# Patient Record
Sex: MALE | Race: OTHER
[De-identification: names, ages, dates, MRNs, and addresses within clinical notes are randomized per-mention and may not be internally consistent; named-entity substitution may affect disease eponyms.]

---

## 2018-01-18 ENCOUNTER — HOSPITAL ENCOUNTER (EMERGENCY)
Dept: HOSPITAL 14 - H.ER | Age: 48
Discharge: HOME | End: 2018-01-18
Payer: SELF-PAY

## 2018-01-18 VITALS
SYSTOLIC BLOOD PRESSURE: 112 MMHG | TEMPERATURE: 100.1 F | OXYGEN SATURATION: 95 % | DIASTOLIC BLOOD PRESSURE: 73 MMHG | HEART RATE: 90 BPM | RESPIRATION RATE: 16 BRPM

## 2018-01-18 DIAGNOSIS — F11.10: Primary | ICD-10-CM

## 2018-01-18 PROCEDURE — 99283 EMERGENCY DEPT VISIT LOW MDM: CPT

## 2018-01-18 PROCEDURE — 82948 REAGENT STRIP/BLOOD GLUCOSE: CPT

## 2018-01-18 PROCEDURE — 96372 THER/PROPH/DIAG INJ SC/IM: CPT

## 2018-01-18 NOTE — ED PDOC
HPI: Psych/Substance Abuse


Time Seen by Provider: 18 18:07


Chief Complaint (Nursing): Substance Abuse


Chief Complaint (Provider): substance abuse


History Per: Patient


Additional Complaint(s): 


47-year-old male presents to emergency department for evaluation of substance 

abuse. Patient admits to using heroin today. He complains of nausea, vomiting 

and abdominal pain upon arrival. He denies use of any other drugs.





Past Medical History


Reviewed: Historical Data, Nursing Documentation, Vital Signs


Vital Signs: 





 Last Vital Signs











Temp  100.1 F H  18 18:01


 


Pulse  90   18 18:01


 


Resp  16   18 18:01


 


BP  112/73   18 18:01


 


Pulse Ox  95   18 18:01














- Medical History


PMH: No Chronic Diseases





- Family History


Family History: States: No Known Family Hx





- Living Arrangements


Living Arrangements: Other (non-domiciled)





- Social History


Drugs: Opiates (heroin)





- Allergies


Allergies/Adverse Reactions: 


 Allergies











Allergy/AdvReac Type Severity Reaction Status Date / Time


 


No Known Allergies Allergy   Verified 18 18:00














Review of Systems


ROS Statement: Except As Marked, All Systems Reviewed And Found Negative


Psych: Positive for: Other (heroin abuse)





Physical Exam





- Reviewed


Nursing Documentation Reviewed: Yes


Vital Signs Reviewed: Yes





- Physical Exam


Appears: Positive for: Well, Non-toxic, No Acute Distress


Skin: Negative for: Rash


Eye Exam: Positive for: Normal appearance


Cardiovascular/Chest: Positive for: Regular Rate, Rhythm


Respiratory: Positive for: Normal Breath Sounds


Neurologic/Psych: Positive for: Alert, Oriented





- ECG


O2 Sat by Pulse Oximetry: 95


Pulse Ox Interpretation: Normal





Medical Decision Making


Medical Decision Makin-year-old male with history of heroin abuse.  





Plan:


IM zofran





Patient is awake and alert, stable for discharge.





Disposition





- Clinical Impression


Clinical Impression: 


 Heroin abuse








- Patient ED Disposition


Is Patient to be Admitted: No





- Disposition


Referrals: 


[facility] [Outside]


Disposition: Routine/Home


Disposition Time: 19:44


Condition: FAIR


Instructions:  Narcotic Abuse (ED)


Forms:  [facility] (English)

## 2018-02-01 ENCOUNTER — HOSPITAL ENCOUNTER (EMERGENCY)
Dept: HOSPITAL 14 - H.ER | Age: 48
LOS: 1 days | Discharge: HOME | End: 2018-02-02
Payer: SELF-PAY

## 2018-02-01 VITALS — RESPIRATION RATE: 16 BRPM | TEMPERATURE: 97.9 F

## 2018-02-01 DIAGNOSIS — F11.10: Primary | ICD-10-CM

## 2018-02-01 NOTE — ED PDOC
HPI: Psych/Substance Abuse


Time Seen by Provider: 02/01/18 22:16


Chief Complaint (Nursing): Substance Abuse


Chief Complaint (Provider): Substance Abuse


ED Caveat: Intoxicated


History Per: Patient


History/Exam Limitations: intoxication


Additional Complaint(s): 





Santos is a 48 y/o male with known heroine abuse who was brought in by EMS for 

heroine abuse. Patient admits to using heroine today but denies any other drugs 

or alcohol. He denies trauma.





PMD: None





Past Medical History


Reviewed: Historical Data, Nursing Documentation, Vital Signs


Vital Signs: 





 Last Vital Signs











Temp  97.9 F   02/01/18 22:06


 


Pulse  61   02/01/18 22:06


 


Resp  16   02/01/18 22:06


 


BP  142/91 H  02/01/18 22:06


 


Pulse Ox  100   02/01/18 22:06














- Family History


Family History: States: Unknown Family Hx





- Social History


Drugs: Opiates (heroine)





- Allergies


Allergies/Adverse Reactions: 


 Allergies











Allergy/AdvReac Type Severity Reaction Status Date / Time


 


No Known Allergies Allergy   Verified 01/18/18 18:00














Review of Systems


Review Of Systems: ROS cannot be obtained secondary to pt's inabilty to answer 

questions.





Physical Exam





- Reviewed


Nursing Documentation Reviewed: Yes


Vital Signs Reviewed: Yes





- Physical Exam


Appears: Positive for: No Acute Distress


Head Exam: Positive for: ATRAUMATIC, NORMAL INSPECTION, NORMOCEPHALIC


Skin: Positive for: Normal Color, Warm, Dry


Eye Exam: Negative for: Normal appearance (pinpoint pupils b/l)


ENT: Positive for: Normal ENT Inspection


Neck: Positive for: Normal, Painless ROM, Supple


Cardiovascular/Chest: Positive for: Regular Rate, Rhythm.  Negative for: Murmur


Respiratory: Positive for: Normal Breath Sounds (maintaining airway).  Negative 

for: Respiratory Distress


Gastrointestinal/Abdominal: Positive for: Normal Exam, Bowel Sounds, Soft.  

Negative for: Tenderness


Back: Positive for: Normal Inspection


Extremity: Positive for: Normal ROM.  Negative for: Pedal Edema, Deformity


Neurologic/Psych: Negative for: Alert (arousable to voice, answers questions, 

falls asleep quickly), Oriented





- ECG


O2 Sat by Pulse Oximetry: 100 (RA)


Pulse Ox Interpretation: Normal





Medical Decision Making


Medical Decision Making: 





Time: 23:00


Initial Impression: Heroine Abuse


Initial Plan:


--Reevaluation





Time: 5:07


--Patient is awake, alert, and oriented x 3





Upon provider evaluation patient is medically stable, and requires no further 

treatment in the ED at this time. Counseling was provided and all questions 

were answered regarding diagnosis and need for follow up. There is agreement to 

discharge plan. Return if symptoms persist or worsen.





--------------------------------------------------------------------------------

-----------------


Scribe Attestation:


Documented by Nate Terrell, acting as a scribe for Dr. Samy Cormier MD





Provider Scribe Attestation:


All medical record entries made by the Scribe were at my direction and 

personally dictated by me. I have reviewed the chart and agree that the record 

accurately reflects my personal performance of the history, physical exam, 

medical decision making, and the department course for this patient. I have 

also personally directed, reviewed, and agree with the discharge instructions 

and disposition.








Disposition





- Clinical Impression


Clinical Impression: 


 Heroin abuse








- Patient ED Disposition


Is Patient to be Admitted: No


Counseled Patient/Family Regarding: Studies Performed, Diagnosis, Need For 

Followup





- Disposition


Referrals: 


[facility] [Outside]


Disposition: Routine/Home


Disposition Time: 05:07


Condition: STABLE


Instructions:  Narcotic Abuse (ED)


Forms:  [facility] (English)

## 2018-02-02 VITALS — OXYGEN SATURATION: 100 %

## 2018-02-02 VITALS — DIASTOLIC BLOOD PRESSURE: 93 MMHG | SYSTOLIC BLOOD PRESSURE: 157 MMHG | HEART RATE: 65 BPM

## 2018-04-01 ENCOUNTER — HOSPITAL ENCOUNTER (EMERGENCY)
Dept: HOSPITAL 14 - H.ER | Age: 48
Discharge: HOME | End: 2018-04-01
Payer: SELF-PAY

## 2018-04-01 VITALS — RESPIRATION RATE: 18 BRPM | SYSTOLIC BLOOD PRESSURE: 130 MMHG | DIASTOLIC BLOOD PRESSURE: 80 MMHG | TEMPERATURE: 98 F

## 2018-04-01 VITALS — HEART RATE: 97 BPM | OXYGEN SATURATION: 98 %

## 2018-04-01 DIAGNOSIS — Z76.5: ICD-10-CM

## 2018-04-01 DIAGNOSIS — M54.9: Primary | ICD-10-CM

## 2018-04-01 NOTE — ED PDOC
HPI: Back


Time Seen by Provider: 04/01/18 00:14


Chief Complaint (Nursing): Back Pain


Chief Complaint (Provider): Back Pain


History Per: Patient, EMS


History/Exam Limitations: other (Patient uncooperative)


Onset/Duration Of Symptoms: Hrs (prior to arrival)


Current Symptoms Are (Timing): Still Present


Additional Complaint(s): 


Santos Flores is a 47 year old male with unknown past medical history, who was 

brought tot EMS for evaluation of lower back pain, [place]. Upon further questioning, patient states that he just wants to 

sleep and to "leave him alone." As a result, no further information could be 

obtained. Patient states he has no complaints at present, despite EMS report.





PMD: unknown








Past Medical History


Reviewed: Historical Data, Nursing Documentation, Vital Signs


Vital Signs: 


 Last Vital Signs











Temp  98.0 F   04/01/18 00:10


 


Pulse  97 H  04/01/18 00:53


 


Resp  18   04/01/18 00:53


 


BP  130/80   04/01/18 00:10


 


Pulse Ox  98   04/01/18 00:53














- Family History


Family History: States: Unknown Family Hx





- Allergies


Allergies/Adverse Reactions: 


 Allergies











Allergy/AdvReac Type Severity Reaction Status Date / Time


 


No Known Allergies Allergy   Verified 04/01/18 00:10














Review of Systems


ROS Statement: Except As Marked, All Systems Reviewed And Found Negative


Review Of Systems: ROS cannot be obtained secondary to pt's inabilty to answer 

questions. (patient will not answer questions)





Physical Exam





- Reviewed


Nursing Documentation Reviewed: Yes


Vital Signs Reviewed: Yes





- Physical Exam


Appears: Positive for: Non-toxic, No Acute Distress


Head Exam: Positive for: ATRAUMATIC, NORMOCEPHALIC


Skin: Negative for: Diaphoresis


Eye Exam: Positive for: EOMI, PERRL


Neck: Positive for: Painless ROM, Supple


Respiratory: Negative for: Respiratory Distress


Neurologic/Psych: Positive for: Alert, Oriented (x3), Gait (steady in ED).  

Negative for: Aphasia, Facial Droop


Comments: 





Exam limited due to patient being uncooperative with ED staff.





- ECG


O2 Sat by Pulse Oximetry: 98 (RA)


Pulse Ox Interpretation: Normal





Medical Decision Making


Medical Decision Making: 


Time: 00:54


Upon arrival to the ED, patient was uncooperative and stated that he just 

wanted to sleep. Patient requires no further intervention at this time in the 

ED. After provider evaluation, patient offers no medical complaints and is 

stable for discharge. 





Patient instructed to follow-up with pmd / referral provided / the clinic  in 1-

2 days without fail. Return to the emergency room at any time for any new or 

worsening symptoms. Patient states he fully agrees with and understands 

discharge instructions. States that he agrees with the plan and disposition. 

Verbalized and repeated discharge instructions and plan. I have given the 

patient opportunity to ask any additional questions.


--------------------------------------------------------------------------------

-----------------


Scribe Attestation:


Documented by Tracy Palomino acting as a scribe for Flora Ingram PA-C., MD Scribe Attestation:


All medical record entries made by the Scribe were at my direction and 

personally dictated by me. I have reviewed the chart and agree that the record 

accurately reflects my personal performance of the history, physical exam, 

medical decision making, and the department course for this patient. I have 

also personally directed, reviewed, and agree with the discharge instructions 

and disposition.











Disposition





- Clinical Impression


Clinical Impression: 


 Malingering, Back pain








- Patient ED Disposition


Is Patient to be Admitted: No


Counseled Patient/Family Regarding: Diagnosis, Need For Followup





- Disposition


Referrals: 


[facility] [Provider Group]


Disposition: Routine/Home


Disposition Time: 00:54


Condition: FAIR


Instructions:  General (DC)


Forms:  [email or web address] (English)


Print Language: ENGLISH





- POA


Present On Arrival: None

## 2018-04-10 ENCOUNTER — HOSPITAL ENCOUNTER (EMERGENCY)
Dept: HOSPITAL 42 - ED | Age: 48
LOS: 1 days | Discharge: HOME | End: 2018-04-11
Payer: SELF-PAY

## 2018-04-10 VITALS — OXYGEN SATURATION: 99 %

## 2018-04-10 VITALS — BODY MASS INDEX: 22.6 KG/M2

## 2018-04-10 DIAGNOSIS — F10.10: Primary | ICD-10-CM

## 2018-04-11 VITALS — DIASTOLIC BLOOD PRESSURE: 82 MMHG | SYSTOLIC BLOOD PRESSURE: 147 MMHG | RESPIRATION RATE: 18 BRPM | HEART RATE: 60 BPM

## 2019-10-25 NOTE — ED PDOC
Arrival/HPI





- General


Chief Complaint: Alcohol Ingestion


Time Seen by Provider: 04/10/18 22:57


Historian: Patient, EMS





- History of Present Illness


Narrative History of Present Illness (Text): 


04/10/18 23:19


Mark Hall is a 47 year old male, whose past medical history includes alcohol 

intoxication, who presents to the Emergency department brought in by EMS for 

alcohol intoxication. Patient was found outside inebriated by EMS. Patient 

admits to drinking alcohol tonight. Patient denies any somatic complaints. 





Time/Duration: Other (tonight)


Symptom Onset: Gradual


Symptom Course: Unchanged


Activities at Onset: Light





Past Medical History





- Provider Review


Nursing Documentation Reviewed: Yes





- Infectious Disease


Hx of Infectious Diseases: None





- Psychiatric


Hx Substance Use: No





Family/Social History





- Physician Review


Nursing Documentation Reviewed: Yes


Family/Social History: Unknown Family HX


Smoking Status: Unknown If Ever Smoked


Hx Alcohol Use: Yes


Hx Substance Use: No





Allergies/Home Meds


Allergies/Adverse Reactions: 


Allergies





Unobtainable Allergy (Verified 04/10/18 22:39)


 








Home Medications: 


 Home Meds











 Medication  Instructions  Recorded  Confirmed


 


Unobtainable  04/11/18 04/11/18














Review of Systems





- Physician Review


All systems were reviewed & negative as marked: Yes





- Review of Systems


Constitutional: Normal.  absent: Fevers


Eyes: Normal


ENT: Normal


Respiratory: Normal.  absent: SOB, Cough


Cardiovascular: Normal.  absent: Chest Pain


Gastrointestinal: Normal.  absent: Abdominal Pain, Diarrhea, Nausea, Vomiting


Genitourinary Male: Normal.  absent: Dysuria, Frequency, Hematuria, Urinary 

Output Changes


Musculoskeletal: Normal.  absent: Back Pain, Neck Pain


Skin: Normal.  absent: Rash


Neurological: Normal.  absent: Headache, Dizziness


Endocrine: Normal


Hemo/Lymphatic: Normal


Psychiatric: Normal





Physical Exam


Vital Signs Reviewed: Yes


Vital Signs











  Pulse Resp BP Pulse Ox


 


 04/11/18 03:00  58 L  17  153/93 H  99


 


 04/10/18 23:14  56 L  17  117/67  99











Temperature: Afebrile


Blood Pressure: Normal


Pulse: Regular


Respiratory Rate: Normal


Appearance: Positive for: Well-Appearing, Comfortable


Pain Distress: None


Mental Status: Positive for: Alert and Oriented X 3 (Inebriated)





- Systems Exam


Head: Present: Atraumatic, Normocephalic


Pupils: Present: PERRL


Extroacular Muscles: Present: EOMI


Conjunctiva: Present: Normal


Mouth: Present: Moist Mucous Membranes


Neck: Present: Normal Range of Motion


Respiratory/Chest: Present: Clear to Auscultation, Good Air Exchange.  No: 

Respiratory Distress, Accessory Muscle Use


Cardiovascular: Present: Regular Rate and Rhythm, Normal S1, S2.  No: Murmurs


Abdomen: No: Tenderness, Distention, Peritoneal Signs


Back: Present: Normal Inspection


Upper Extremity: Present: Normal Inspection.  No: Cyanosis, Edema


Lower Extremity: Present: Normal Inspection.  No: Edema


Neurological: Present: GCS=15, CN II-XII Intact, Speech Normal


Skin: Present: Warm, Dry, Normal Color.  No: Rashes


Psychiatric: Present: Alert, Oriented x 3, Normal Insight, Normal Concentration





Medical Decision Making


ED Course and Treatment: 


04/10/18 23:19


Impression:


47 year old male brought in for alcohol intoxication tonight. 





Differential Diagnosis included but are not limited to:  alcohol intoxication





Plan:


-- Reassess and disposition





Progress Notes:


04/11/18 05:57


Pt awake, alert, and clinically sober. In no acute distress. Ambulating with 

steady gait. Pt stable for d/c.





- Scribe Statement


The provider has reviewed the documentation as recorded by the Manuel Hwang





Provider Scribe Attestation:


All medical record entries made by the Scribe were at my direction and 

personally dictated by me. I have reviewed the chart and agree that the record 

accurately reflects my personal performance of the history, physical exam, 

medical decision making, and the department course for this patient. I have 

also personally directed, reviewed, and agree with the discharge instructions 

and disposition.








Disposition/Present on Arrival





- Present on Arrival


History of DVT/PE: No


History of Uncontrolled Diabetes: No


Urinary Catheter: No


History of Decub. Ulcer: No


History Surgical Site Infection Following: None





- Disposition


Diagnosis: 


 Alcohol abuse





Disposition: HOME/ ROUTINE


Discharge Instructions (ExitCare):  Alcohol Abuse and Alcoholism (DC)


Forms:  [facility] (English)
None